# Patient Record
Sex: MALE | Race: WHITE | NOT HISPANIC OR LATINO | Employment: FULL TIME | URBAN - METROPOLITAN AREA
[De-identification: names, ages, dates, MRNs, and addresses within clinical notes are randomized per-mention and may not be internally consistent; named-entity substitution may affect disease eponyms.]

---

## 2018-12-27 ENCOUNTER — TRANSFERRED RECORDS (OUTPATIENT)
Dept: HEALTH INFORMATION MANAGEMENT | Facility: CLINIC | Age: 53
End: 2018-12-27

## 2021-07-06 ENCOUNTER — TRANSFERRED RECORDS (OUTPATIENT)
Dept: HEALTH INFORMATION MANAGEMENT | Facility: CLINIC | Age: 56
End: 2021-07-06

## 2021-08-19 ENCOUNTER — TRANSFERRED RECORDS (OUTPATIENT)
Dept: HEALTH INFORMATION MANAGEMENT | Facility: CLINIC | Age: 56
End: 2021-08-19

## 2021-08-19 LAB — EJECTION FRACTION: NORMAL %

## 2022-05-06 ENCOUNTER — TRANSFERRED RECORDS (OUTPATIENT)
Dept: HEALTH INFORMATION MANAGEMENT | Facility: CLINIC | Age: 57
End: 2022-05-06

## 2023-02-01 ENCOUNTER — OFFICE VISIT (OUTPATIENT)
Dept: CARDIOLOGY | Facility: CLINIC | Age: 58
End: 2023-02-01
Payer: COMMERCIAL

## 2023-02-01 VITALS
BODY MASS INDEX: 30.78 KG/M2 | HEIGHT: 70 IN | SYSTOLIC BLOOD PRESSURE: 184 MMHG | HEART RATE: 76 BPM | WEIGHT: 215 LBS | DIASTOLIC BLOOD PRESSURE: 102 MMHG | RESPIRATION RATE: 16 BRPM

## 2023-02-01 DIAGNOSIS — I10 BENIGN ESSENTIAL HYPERTENSION: Primary | ICD-10-CM

## 2023-02-01 DIAGNOSIS — A69.20 LYME DISEASE: ICD-10-CM

## 2023-02-01 DIAGNOSIS — I48.0 PAROXYSMAL ATRIAL FIBRILLATION (H): ICD-10-CM

## 2023-02-01 PROCEDURE — 99205 OFFICE O/P NEW HI 60 MIN: CPT | Performed by: INTERNAL MEDICINE

## 2023-02-01 RX ORDER — METOPROLOL SUCCINATE 25 MG/1
25 TABLET, EXTENDED RELEASE ORAL DAILY
Qty: 90 TABLET | Refills: 3 | Status: SHIPPED | OUTPATIENT
Start: 2023-02-01 | End: 2023-11-16

## 2023-02-01 RX ORDER — LISINOPRIL 2.5 MG/1
2.5 TABLET ORAL DAILY
COMMUNITY
Start: 2018-12-31 | End: 2023-02-01

## 2023-02-01 RX ORDER — LISINOPRIL 5 MG/1
5 TABLET ORAL DAILY
Qty: 90 TABLET | Refills: 3 | Status: SHIPPED | OUTPATIENT
Start: 2023-02-01 | End: 2023-04-04

## 2023-02-01 NOTE — PROGRESS NOTES
[facility]  [phone]      Assessment/Recommendations   Patient with known history of hypertension    With a history of palpitations but recent diagnosis in December of this year of atrial fibrillation.  He had rapid ventricular response at about 120 bpm at rest, was documented by the paramedics with twelve-lead EKG which is included an elevated when he woke up in the morning.  He has had some very brief palpitations since that time.  His CHADS2 vascular score is 1 and he gets up point only for hypertension.  I have therefore asked him to take an aspirin a day and he does not need to be anticoagulated.  He has had just 1 episode that he is aware of.  I would like to get a echocardiogram to evaluate for structural issues and he had a unremarkable stress echocardiogram in 2021.  He had a TSH done in 2018 and we will repeat this as it has been 4 to 5 years.  He does not use much alcohol but does snore and his wife says he stops breathing so we will also refer him to our sleep clinic.  We will get a 7-day monitor to make sure he is not having occult atrial fibrillation, particularly at night.    Blood pressure is elevated today so we will increase his lisinopril to of 5 mg a day which she is really been taking recently and also add metoprolol 25 mg a day.  He will call us if his blood pressure does not come down in the next 3 to 5 days.    We talked today about atrial fibrillation, triggers, and how the CHADS2 vascular score predicts risk for cardioembolic events.  We also touched on pulmonary vein isolation and I told him I would have a very low threshold to refer him to one of our electrophysiologist, even if he has just 1 more episode.  I think he would be agreeable.    After the above testing is complete, I would have a low threshold for asking him to get a calcium score so we can decide what his cardiovascular risk is and how aggressive to be with his lipid panel.      I appreciate the  opportunity be involved in his care.  60 minutes spent with chart review, patient visit, further chart review, documentation, and .     History of Present Illness/Subjective    Mr. Jesse Rosenthal is a 57 year old male with known history of hypertension which has been treated since 2018.  He has been treated with lisinopril.  He is overall been healthy although has not been exercising much of late and is trying to pick it up a bit.  In 2018 he had a work-up for some palpitations and his echocardiogram was unremarkable.  He had an unremarkable stress echocardiogram in 2021.  In December of this year he decided to stop at [employer] where he belongs and walk on a treadmill after work.  Everything went good for about 45 minutes but at the end of his walk he noticed his heart rate shot up to 195.  He felt some palpitations and did not feel quite right.  He went home to [place] where he is temporarily staying with his mother and they decided to call the paramedics.  The paramedics arrived, did a twelve-lead EKG which she has available and shows atrial fibrillation with a rapid ventricular response of 120 bpm.  There are no acute ST-T wave changes.  The paramedics told him they would probably send him home from the ER as his vital signs were normal and he went to bed that night and in the morning he felt fine.  He is had some very brief palpitations since that time but nothing like the episode he had in mid December.  He did go and because of high blood pressure and had a twelve-lead EKG on 30 December and he is in sinus rhythm.  His blood pressures been a bit higher of late and he typically would take 2.5 of lisinopril but has been taking 5 mg a day and is nearly out of the medication.    He denies orthopnea or paroxysmal nocturnal dyspnea.  His spouse says that he snores and stops breathing and she is mention to him that she feels like he has sleep apnea.  He drinks 1 or 2 alcoholic beverages every  "couple of weeks and does not use much caffeine.  He had a TSH in 2018 which was normal and I cannot find 1 since that time.    He does not get chest discomfort when he is physically active.    He is not diabetic although his blood sugar was a little bit elevated when he went in the urgent care recently.  He is never smoked cigarettes.  He does not have a family history of premature coronary artery disease in first-degree relatives but in grandparents he does.  His mother has atrial fibrillation.  He has an LDL cholesterol of around 130 240.  He has been treated for hypertension.  He has never had rheumatic fever, heart murmur, cerebrovascular accident or TIA and has never had a syncopal episode.    He grew up in [place] and went to [employer].  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  He is  and has 2 adult children.  His wife and the patient have a [place] of [place] and he is temporarily staying with his mother while they are deciding when and where they want to end up living.        ECG: Personally reviewed.  2 EKGs reviewed.  The first from the paramedic showed atrial fibrillation with a ventricular response of 120 and no significant ST-T wave changes.  Second EKG from late December showed sinus rhythm without ST changes. .       Physical Examination Review of Systems   BP (!) 184/102 (BP Location: Right arm, Patient Position: Sitting, Cuff Size: Adult Large)   Pulse 76   Resp 16   Ht 1.778 m (5' 10\")   Wt 97.5 kg (215 lb)   BMI 30.85 kg/m    Body mass index is 30.85 kg/m .  Wt Readings from Last 3 Encounters:   02/01/23 97.5 kg (215 lb)     General Appearance:   Alert, cooperative and in no acute distress.   ENT/Mouth: Patient wearing a mask.      EYES:  no scleral icterus, normal conjunctivae   Neck: JVP normal. No Hepatojugular reflux. Thyroid not visualized.   Chest/Lungs:   Lungs are clear to auscultation, equal chest " "wall expansion.   Cardiovascular:   S1, S2 without murmur ,clicks or rubs. Brachial, radial and posterior tibial pulses are intact and symetric. No carotid bruits noted   Abdomen:  Nontender. BS+. No bruits.   Extremities: No cyanosis, clubbing or edema   Skin: no xanthelasma, warm.    Neurologic: normal arm movement bilateral, no tremors     Psychiatric: Appropriate affect.      Enc Vitals  BP: (!) 184/102  Pulse: 76  Resp: 16  Weight: 97.5 kg (215 lb)  Height: 177.8 cm (5' 10\")                                           Medical History  Surgical History Family History Social History   No past medical history on file. No past surgical history on file. No family history on file. Social History     Socioeconomic History     Marital status:      Spouse name: Not on file     Number of children: Not on file     Years of education: Not on file     Highest education level: Not on file   Occupational History     Not on file   Tobacco Use     Smoking status: Never     Smokeless tobacco: Former     Types: Chew     Tobacco comments:     On occasion    Vaping Use     Vaping Use: Never used   Substance and Sexual Activity     Alcohol use: Not on file     Drug use: Never     Sexual activity: Not on file   Other Topics Concern     Not on file   Social History Narrative     Not on file     Social Determinants of Health     Financial Resource Strain: Not on file   Food Insecurity: Not on file   Transportation Needs: Not on file   Physical Activity: Not on file   Stress: Not on file   Social Connections: Not on file   Intimate Partner Violence: Not on file   Housing Stability: Not on file          Medications  Allergies   Current Outpatient Medications   Medication Sig Dispense Refill     lisinopril (ZESTRIL) 5 MG tablet Take 1 tablet (5 mg) by mouth daily 90 tablet 3     metoprolol succinate ER (TOPROL XL) 25 MG 24 hr tablet Take 1 tablet (25 mg) by mouth daily 90 tablet 3     omeprazole (PRILOSEC) 20 MG DR capsule Take 20 mg " by mouth daily      No Known Allergies      Lab Results    Chemistry/lipid CBC Cardiac Enzymes/BNP/TSH/INR   No results found for: CHOL, HDL, TRIG, CHOLHDL, CREATININE, BUN, NA, CO2 No results found for: WBC, HGB, HCT, MCV, PLT No results found for: CKTOTAL, CKMB, TROPONINI, BNP, TSH, INR

## 2023-02-09 ENCOUNTER — LAB (OUTPATIENT)
Dept: CARDIOLOGY | Facility: CLINIC | Age: 58
End: 2023-02-09
Payer: COMMERCIAL

## 2023-02-09 ENCOUNTER — HOSPITAL ENCOUNTER (OUTPATIENT)
Dept: CARDIOLOGY | Facility: CLINIC | Age: 58
Discharge: HOME | End: 2023-02-09
Attending: INTERNAL MEDICINE | Admitting: INTERNAL MEDICINE
Payer: COMMERCIAL

## 2023-02-09 DIAGNOSIS — A69.20 LYME DISEASE: ICD-10-CM

## 2023-02-09 DIAGNOSIS — I48.0 PAROXYSMAL ATRIAL FIBRILLATION (H): ICD-10-CM

## 2023-02-09 DIAGNOSIS — I10 BENIGN ESSENTIAL HYPERTENSION: ICD-10-CM

## 2023-02-09 LAB
LVEF ECHO: NORMAL
TSH SERPL DL<=0.005 MIU/L-ACNC: 2.32 UIU/ML (ref 0.3–5)

## 2023-02-09 PROCEDURE — 36415 COLL VENOUS BLD VENIPUNCTURE: CPT

## 2023-02-09 PROCEDURE — 84443 ASSAY THYROID STIM HORMONE: CPT

## 2023-02-09 PROCEDURE — 93306 TTE W/DOPPLER COMPLETE: CPT | Mod: 26 | Performed by: GENERAL ACUTE CARE HOSPITAL

## 2023-02-09 PROCEDURE — 255N000002 HC RX 255 OP 636: Performed by: INTERNAL MEDICINE

## 2023-02-09 RX ADMIN — PERFLUTREN 3 ML: 6.52 INJECTION, SUSPENSION INTRAVENOUS at 08:25

## 2023-03-30 ENCOUNTER — TELEPHONE (OUTPATIENT)
Dept: CARDIOLOGY | Facility: CLINIC | Age: 58
End: 2023-03-30
Payer: COMMERCIAL

## 2023-03-30 DIAGNOSIS — I10 BENIGN ESSENTIAL HYPERTENSION: ICD-10-CM

## 2023-03-30 NOTE — TELEPHONE ENCOUNTER
Dr. Evans, please review update below. Any new recommendations at this time? -ejb    ==  Received update from patient after he's been monitoring his BP at work for the last 2 months. Patient does not have specific readings to share, but notes on average, his BP is in the 160/100 mmHg range.   He confirms that he is taking Lisinopril 5 mg daily along with metoprolol 25 mg daily. Patient tolerates meds without complaint.   Will forward to [facility] and get back to patient after his review when he returns to the office.

## 2023-04-03 NOTE — TELEPHONE ENCOUNTER
----- Message -----  From: Tony Evans MD  Sent: 4/2/2023   9:59 AM CDT  To: DILMA Hatch,    Could he get a BMP (if none in last 3 months) and increase lisinopril to 10 mg/day. Call back some BP's.    Thanks,    Tony    ==  Reviewed response and recommendations with patient. He verbalized understanding and agreement with plan.   Med list updated.   Lab order placed.   Patient will have labs completed in 7-10 days at  outpt lab. -ejb

## 2023-04-04 RX ORDER — LISINOPRIL 10 MG/1
10 TABLET ORAL DAILY
Qty: 90 TABLET | Refills: 3 | Status: SHIPPED | OUTPATIENT
Start: 2023-04-04 | End: 2024-04-29

## 2023-04-14 ENCOUNTER — LAB (OUTPATIENT)
Dept: LAB | Facility: CLINIC | Age: 58
End: 2023-04-14
Payer: COMMERCIAL

## 2023-04-14 DIAGNOSIS — I10 BENIGN ESSENTIAL HYPERTENSION: ICD-10-CM

## 2023-04-14 LAB
ANION GAP SERPL CALCULATED.3IONS-SCNC: 8 MMOL/L (ref 5–18)
BUN SERPL-MCNC: 15 MG/DL (ref 8–22)
CALCIUM SERPL-MCNC: 9.7 MG/DL (ref 8.5–10.5)
CHLORIDE BLD-SCNC: 105 MMOL/L (ref 98–107)
CO2 SERPL-SCNC: 26 MMOL/L (ref 22–31)
CREAT SERPL-MCNC: 0.96 MG/DL (ref 0.7–1.3)
GFR SERPL CREATININE-BSD FRML MDRD: >90 ML/MIN/1.73M2
GLUCOSE BLD-MCNC: 98 MG/DL (ref 70–125)
POTASSIUM BLD-SCNC: 4.5 MMOL/L (ref 3.5–5)
SODIUM SERPL-SCNC: 139 MMOL/L (ref 136–145)

## 2023-04-14 PROCEDURE — 36415 COLL VENOUS BLD VENIPUNCTURE: CPT

## 2023-04-14 PROCEDURE — 80048 BASIC METABOLIC PNL TOTAL CA: CPT

## 2023-11-15 DIAGNOSIS — I10 BENIGN ESSENTIAL HYPERTENSION: ICD-10-CM

## 2023-11-16 RX ORDER — METOPROLOL SUCCINATE 25 MG/1
25 TABLET, EXTENDED RELEASE ORAL DAILY
Qty: 90 TABLET | Refills: 1 | Status: SHIPPED | OUTPATIENT
Start: 2023-11-16

## 2024-04-27 DIAGNOSIS — I10 BENIGN ESSENTIAL HYPERTENSION: ICD-10-CM

## 2024-04-29 RX ORDER — LISINOPRIL 10 MG/1
10 TABLET ORAL DAILY
Qty: 90 TABLET | Refills: 1 | Status: SHIPPED | OUTPATIENT
Start: 2024-04-29

## 2024-08-17 DIAGNOSIS — I10 BENIGN ESSENTIAL HYPERTENSION: ICD-10-CM

## 2024-08-19 RX ORDER — METOPROLOL SUCCINATE 25 MG/1
25 TABLET, EXTENDED RELEASE ORAL DAILY
Qty: 90 TABLET | Refills: 1 | OUTPATIENT
Start: 2024-08-19

## 2024-08-19 NOTE — TELEPHONE ENCOUNTER
Per appt notes pt declines scheduling further follow up with our office. Defer request to PCP.    6/17/24 INSURE SAYS PT HAS TO -.. FOR APPT. PATIENT DOES NOT WANT TO R/S  XXX THJ - [id number] 4/29 LJ PER KEVON Please schedule annual follow up- can be with  or [facility],